# Patient Record
Sex: FEMALE | Race: BLACK OR AFRICAN AMERICAN | NOT HISPANIC OR LATINO | Employment: UNEMPLOYED | URBAN - METROPOLITAN AREA
[De-identification: names, ages, dates, MRNs, and addresses within clinical notes are randomized per-mention and may not be internally consistent; named-entity substitution may affect disease eponyms.]

---

## 2019-02-22 PROBLEM — Z34.90 ENCOUNTER FOR ELECTIVE INDUCTION OF LABOR: Status: ACTIVE | Noted: 2019-02-22

## 2019-03-20 ENCOUNTER — OCCUPATIONAL HEALTH (OUTPATIENT)
Dept: URGENT CARE | Facility: CLINIC | Age: 24
End: 2019-03-20

## 2019-03-20 PROCEDURE — 80305 PR NON-DOT DRUG SCREENS: ICD-10-PCS | Mod: S$GLB,,, | Performed by: EMERGENCY MEDICINE

## 2019-03-20 PROCEDURE — 86580 PR  TB INTRADERMAL TEST: ICD-10-PCS | Mod: S$GLB,,, | Performed by: EMERGENCY MEDICINE

## 2019-03-20 PROCEDURE — 80305 DRUG TEST PRSMV DIR OPT OBS: CPT | Mod: S$GLB,,, | Performed by: EMERGENCY MEDICINE

## 2019-03-20 PROCEDURE — 86580 TB INTRADERMAL TEST: CPT | Mod: S$GLB,,, | Performed by: EMERGENCY MEDICINE

## 2019-05-27 ENCOUNTER — HOSPITAL ENCOUNTER (INPATIENT)
Facility: HOSPITAL | Age: 24
LOS: 2 days | Discharge: HOME | DRG: 881 | End: 2019-05-29
Attending: PSYCHIATRY & NEUROLOGY | Admitting: PSYCHIATRY & NEUROLOGY
Payer: MEDICAID

## 2019-05-27 DIAGNOSIS — F32.A DEPRESSION: ICD-10-CM

## 2019-05-27 LAB
25(OH)D3+25(OH)D2 SERPL-MCNC: 10 NG/ML (ref 30–96)
ALBUMIN SERPL BCP-MCNC: 4.1 G/DL (ref 3.5–5.2)
ALP SERPL-CCNC: 57 U/L (ref 55–135)
ALT SERPL W/O P-5'-P-CCNC: 19 U/L (ref 10–44)
ANION GAP SERPL CALC-SCNC: 11 MMOL/L (ref 8–16)
AST SERPL-CCNC: 19 U/L (ref 10–40)
BASOPHILS # BLD AUTO: 0.05 K/UL (ref 0–0.2)
BASOPHILS NFR BLD: 0.9 % (ref 0–1.9)
BILIRUB SERPL-MCNC: 1.7 MG/DL (ref 0.1–1)
BUN SERPL-MCNC: 21 MG/DL (ref 6–20)
CALCIUM SERPL-MCNC: 10.1 MG/DL (ref 8.7–10.5)
CHLORIDE SERPL-SCNC: 105 MMOL/L (ref 95–110)
CHOLEST SERPL-MCNC: 196 MG/DL (ref 120–199)
CHOLEST/HDLC SERPL: 4.6 {RATIO} (ref 2–5)
CO2 SERPL-SCNC: 21 MMOL/L (ref 23–29)
CREAT SERPL-MCNC: 0.9 MG/DL (ref 0.5–1.4)
DIFFERENTIAL METHOD: ABNORMAL
EOSINOPHIL # BLD AUTO: 0.3 K/UL (ref 0–0.5)
EOSINOPHIL NFR BLD: 4.3 % (ref 0–8)
ERYTHROCYTE [DISTWIDTH] IN BLOOD BY AUTOMATED COUNT: 14.9 % (ref 11.5–14.5)
EST. GFR  (AFRICAN AMERICAN): >60 ML/MIN/1.73 M^2
EST. GFR  (NON AFRICAN AMERICAN): >60 ML/MIN/1.73 M^2
ESTIMATED AVG GLUCOSE: 91 MG/DL (ref 68–131)
FOLATE SERPL-MCNC: 6.6 NG/ML (ref 4–24)
GLUCOSE SERPL-MCNC: 86 MG/DL (ref 70–110)
GLUCOSE SERPL-MCNC: 86 MG/DL (ref 70–110)
HBA1C MFR BLD HPLC: 4.8 % (ref 4–5.6)
HCT VFR BLD AUTO: 39.5 % (ref 37–48.5)
HDLC SERPL-MCNC: 43 MG/DL (ref 40–75)
HDLC SERPL: 21.9 % (ref 20–50)
HGB BLD-MCNC: 12.7 G/DL (ref 12–16)
LDLC SERPL CALC-MCNC: 136 MG/DL (ref 63–159)
LYMPHOCYTES # BLD AUTO: 2.2 K/UL (ref 1–4.8)
LYMPHOCYTES NFR BLD: 37.2 % (ref 18–48)
MCH RBC QN AUTO: 25.7 PG (ref 27–31)
MCHC RBC AUTO-ENTMCNC: 32.2 G/DL (ref 32–36)
MCV RBC AUTO: 80 FL (ref 82–98)
MONOCYTES # BLD AUTO: 0.5 K/UL (ref 0.3–1)
MONOCYTES NFR BLD: 8.8 % (ref 4–15)
NEUTROPHILS # BLD AUTO: 2.9 K/UL (ref 1.8–7.7)
NEUTROPHILS NFR BLD: 48.8 % (ref 38–73)
NONHDLC SERPL-MCNC: 153 MG/DL
PLATELET # BLD AUTO: 296 K/UL (ref 150–350)
PMV BLD AUTO: 11.3 FL (ref 9.2–12.9)
POTASSIUM SERPL-SCNC: 3.9 MMOL/L (ref 3.5–5.1)
PROT SERPL-MCNC: 7.8 G/DL (ref 6–8.4)
RBC # BLD AUTO: 4.94 M/UL (ref 4–5.4)
SODIUM SERPL-SCNC: 137 MMOL/L (ref 136–145)
TRIGL SERPL-MCNC: 85 MG/DL (ref 30–150)
TSH SERPL DL<=0.005 MIU/L-ACNC: 0.79 UIU/ML (ref 0.4–4)
VIT B12 SERPL-MCNC: 704 PG/ML (ref 210–950)
WBC # BLD AUTO: 5.88 K/UL (ref 3.9–12.7)

## 2019-05-27 PROCEDURE — 90833 PR PSYCHOTHERAPY W/PATIENT W/E&M, 30 MIN (ADD ON): ICD-10-PCS | Mod: AF,HB,S$PBB, | Performed by: PSYCHIATRY & NEUROLOGY

## 2019-05-27 PROCEDURE — 11400000 HC PSYCH PRIVATE ROOM

## 2019-05-27 PROCEDURE — 90833 PSYTX W PT W E/M 30 MIN: CPT | Mod: AF,HB,S$PBB, | Performed by: PSYCHIATRY & NEUROLOGY

## 2019-05-27 PROCEDURE — 25000003 PHARM REV CODE 250: Performed by: PSYCHIATRY & NEUROLOGY

## 2019-05-27 PROCEDURE — 82607 VITAMIN B-12: CPT

## 2019-05-27 PROCEDURE — 82746 ASSAY OF FOLIC ACID SERUM: CPT

## 2019-05-27 PROCEDURE — 83540 ASSAY OF IRON: CPT

## 2019-05-27 PROCEDURE — 99222 1ST HOSP IP/OBS MODERATE 55: CPT | Mod: ,,, | Performed by: NURSE PRACTITIONER

## 2019-05-27 PROCEDURE — 80061 LIPID PANEL: CPT

## 2019-05-27 PROCEDURE — 99222 PR INITIAL HOSPITAL CARE,LEVL II: ICD-10-PCS | Mod: ,,, | Performed by: NURSE PRACTITIONER

## 2019-05-27 PROCEDURE — 85025 COMPLETE CBC W/AUTO DIFF WBC: CPT

## 2019-05-27 PROCEDURE — 99223 1ST HOSP IP/OBS HIGH 75: CPT | Mod: AF,HB,S$PBB, | Performed by: PSYCHIATRY & NEUROLOGY

## 2019-05-27 PROCEDURE — 36415 COLL VENOUS BLD VENIPUNCTURE: CPT

## 2019-05-27 PROCEDURE — 83036 HEMOGLOBIN GLYCOSYLATED A1C: CPT

## 2019-05-27 PROCEDURE — 82728 ASSAY OF FERRITIN: CPT

## 2019-05-27 PROCEDURE — 84443 ASSAY THYROID STIM HORMONE: CPT

## 2019-05-27 PROCEDURE — 99223 PR INITIAL HOSPITAL CARE,LEVL III: ICD-10-PCS | Mod: AF,HB,S$PBB, | Performed by: PSYCHIATRY & NEUROLOGY

## 2019-05-27 PROCEDURE — 80053 COMPREHEN METABOLIC PANEL: CPT

## 2019-05-27 PROCEDURE — 82306 VITAMIN D 25 HYDROXY: CPT

## 2019-05-27 RX ORDER — DOCUSATE SODIUM 100 MG/1
100 CAPSULE, LIQUID FILLED ORAL DAILY PRN
Status: DISCONTINUED | OUTPATIENT
Start: 2019-05-27 | End: 2019-05-29 | Stop reason: HOSPADM

## 2019-05-27 RX ORDER — OLANZAPINE 10 MG/1
10 TABLET ORAL EVERY 6 HOURS PRN
Status: DISCONTINUED | OUTPATIENT
Start: 2019-05-27 | End: 2019-05-29 | Stop reason: HOSPADM

## 2019-05-27 RX ORDER — LOPERAMIDE HYDROCHLORIDE 2 MG/1
2 CAPSULE ORAL
Status: DISCONTINUED | OUTPATIENT
Start: 2019-05-27 | End: 2019-05-29 | Stop reason: HOSPADM

## 2019-05-27 RX ORDER — ESCITALOPRAM OXALATE 5 MG/1
5 TABLET ORAL DAILY
Status: DISCONTINUED | OUTPATIENT
Start: 2019-05-27 | End: 2019-05-29 | Stop reason: HOSPADM

## 2019-05-27 RX ORDER — OLANZAPINE 10 MG/2ML
10 INJECTION, POWDER, FOR SOLUTION INTRAMUSCULAR EVERY 6 HOURS PRN
Status: DISCONTINUED | OUTPATIENT
Start: 2019-05-27 | End: 2019-05-29 | Stop reason: HOSPADM

## 2019-05-27 RX ORDER — MAG HYDROX/ALUMINUM HYD/SIMETH 200-200-20
30 SUSPENSION, ORAL (FINAL DOSE FORM) ORAL EVERY 6 HOURS PRN
Status: DISCONTINUED | OUTPATIENT
Start: 2019-05-27 | End: 2019-05-29 | Stop reason: HOSPADM

## 2019-05-27 RX ORDER — HYDROXYZINE PAMOATE 50 MG/1
50 CAPSULE ORAL EVERY 6 HOURS PRN
Status: DISCONTINUED | OUTPATIENT
Start: 2019-05-27 | End: 2019-05-29 | Stop reason: HOSPADM

## 2019-05-27 RX ORDER — ACETAMINOPHEN 325 MG/1
650 TABLET ORAL EVERY 6 HOURS PRN
Status: DISCONTINUED | OUTPATIENT
Start: 2019-05-27 | End: 2019-05-29 | Stop reason: HOSPADM

## 2019-05-27 RX ADMIN — ESCITALOPRAM OXALATE 5 MG: 5 TABLET, FILM COATED ORAL at 02:05

## 2019-05-27 NOTE — H&P
"PSYCHIATRY INPATIENT ADMISSION NOTE - H & P      5/27/2019 10:19 AM   Aida Mathew   1995   [id number]           DATE OF ADMISSION: 5/27/2019  2:37 AM    SITE: [facility]    CURRENT LEGAL STATUS: PEC and/or CEC      HISTORY    CHIEF COMPLAINT   Aida Mathew is a 24 y.o. female with no psychiatric history, currently admitted to the inpatient unit with the following chief complaint: depression, SI/HI, "I texted my mom that I wanted to kill myself, because I was angry"    HPI   (Elements: Location, Quality, Severity, Duration, Timing, Content, Modifying Factors, Associated Signs & Symptoms)    The patient was seen and examined. The chart was reviewed.    The patient presented to the ER on 5/26/19 with complaints of mood disturbance and HI/SI. Per staff notes:  -joet came to ER by police for suicidal and homicidal ideations reported by her mother. patient is 3 months post pardon. patient was upset with mother for taking car away that she stated she wanted to hurt self and baby. Denies any alcohol or drug use. Patient lives with mom and step father. Patient has 2 girls 5 yr old and 3 months ago. She has high school diploma and currently unemployed,. Currently denies any suicidal or homicidal .    The patient was medically cleared and admitted to the U.     The patient reports no past history of psychiatric issues. She reports that she had her second child in February (no h/o post-partum psychiatric issues), and since then she has had significant post-partum depression issues. She reports that she has been crying a lot and feeling more depressed. She also reports that she has had increased anxiety over the last few months.     Psychosocial stressors include unemployment/financial stress, family stressors, relationship stressors and transportation issues.     She is not breastfeeding.    The patient reports, "I had a really bad day yesterday.. I got mad and said things I didn't mean. I feel better " "today."    +Symptoms of Depression: +diminished mood or loss of interest/anhedonia; +irritability, no diminished energy, no change in sleep, no change in appetite, no diminished concentration or cognition or indecisiveness, no PMA/R, denied excessive guilt or hopelessness or worthlessness, denied current suicidal ideations (+SI expressed in the last 24 hours)    Denied changes in Sleep: no trouble with initiation, maintenance, or early morning awakening with inability to return to sleep    Denied current Suicidal/Homicidal ideations: denied active/passive ideations, organized plans, or future intentions; +SI reported in th last 24 hours; pt denied ever stating that she would hurt her child- she claims that her mother said that in order to force her into the hospital    Denied past or current Symptoms of psychosis: no hallucinations, delusions, disorganized thinking, disorganized behavior or abnormal motor behavior, or negative symptoms     Denied past or current Symptoms of eliud or hypomania: no elevated, expansive, or irritable mood with no increased energy or activity; with inflated self-esteem or grandiosity, decreased need for sleep, increased rate of speech, FOI or racing thoughts, distractibility, increased goal directed activity or PMA, or risky/disinhibited behavior    Some Symptoms of DANY: +excessive anxiety/worry/fear, +more days than not, not about numerous issues, +difficult to control, with periodic symptoms of restlessness, fatigue, poor concentration, irritability, muscle tension, and sleep disturbance; +causes functionally impairing distress; adjustment in nature    Denied Symptoms of Panic Disorder: no recurrent panic attacks, without agoraphobia    Denied Symptoms of PTSD: no h/o trauma; no re-experiencing/intrusive symptoms, avoidant behavior, negative alterations in cognition or mood, or hyperarousal symptoms;  without dissociative symptoms     Denied Symptoms of OCD: no obsessions or " compulsions     Denied Symptoms of Eating Disorders: no anorexia, bulimia or binging    Denied Substance Use: no intoxication, withdrawal, tolerance, used in larger amounts or duration than intended, unsuccessful attempts to limit or quit, increased time engaging in or seeking out, cravings or strong desire to use, failure to fulfill obligations, negative consequences in social/interpersonal/occupational,/recreational areas, use in dangerous situations, or medical or psychological consequences       PSYCHOTHERAPY ADD-ON +[id number]   30 (16-37*) minutes    Time: 16 minutes  Participants: Met with patient    Therapeutic Intervention Type: behavior modifying psychotherapy, supportive psychotherapy  Why chosen therapy is appropriate versus another modality: relevant to diagnosis, patient responds to this modality, evidence based practice    Target symptoms: depression, anxiety   Primary focus: depression, psychosocial stressors  Psychotherapeutic techniques: supportive and problem solving techniques; psycho-education;     Outcome monitoring methods: self-report, observation    Patient's response to intervention:  The patient's response to intervention is accepting.    Progress toward goals:  The patient's progress toward goals is fair .            PAST PSYCHIATRIC HISTORY  Previous Psychiatric Hospitalizations: denied   Previous SI/HI: denied  Previous Suicide Attempts: denied   Previous Medication Trials: denied  Psychiatric Care (current & past): denied  History of Psychotherapy: denied  History of Violence: denied      SUBSTANCE ABUSE HISTORY   Tobacco: denied  Alcohol: denied  Illicit Substances: denied  Misuse of Prescription Medications: denied  Detoxes: denied  Rehabs: denied  12 Step Meetings: denied  Periods of Sobriety: denied  Withdrawal: denied        PAST MEDICAL & SURGICAL HISTORY   History reviewed. No pertinent past medical history.  History reviewed. No pertinent surgical history.      CURRENT MEDICATION  REGIMEN   Home Meds:   Prior to Admission medications    Medication Sig Start Date End Date Taking? Authorizing Provider   ferrous sulfate 325 (65 FE) MG EC tablet Take 325 mg by mouth 3 (three) times daily with meals.    Historical Provider, MD   ibuprofen (ADVIL,MOTRIN) 600 MG tablet Take 1 tablet (600 mg total) by mouth every 6 (six) hours. 2/24/19   Braeden Wayne MD   oxyCODONE-acetaminophen (PERCOCET) 5-325 mg per tablet Take 1 tablet by mouth every 4 (four) hours as needed. 2/24/19   Braeden Wayne MD   prenatal 25/iron fum/folic/dha (PRENATAL-1 ORAL) Take 1 tablet by mouth once daily.    Historical Provider, MD         OTC Meds: none    Scheduled Meds:    PRN Meds: hydrOXYzine pamoate, OLANZapine **AND** OLANZapine   Psychotherapeutics (From admission, onward)    Start     Stop Route Frequency Ordered    05/27/19 0244  OLANZapine tablet 10 mg  (Olanzapine)      -- Oral Every 6 hours PRN 05/27/19 0244    05/27/19 0244  OLANZapine injection 10 mg  (Olanzapine)      -- IM Every 6 hours PRN 05/27/19 0244            ALLERGIES   Review of patient's allergies indicates:   Allergen Reactions    Amoxil [amoxicillin]     Penicillins          NEUROLOGIC HISTORY  Seizures: denied   Head trauma: denied       FAMILY PSYCHIATRIC HISTORY   Family History   Problem Relation Age of Onset    Diabetes Mother     Diabetes Maternal Grandmother               SOCIAL HISTORY  Developmental/Childhood: met milestones  History of Physical/Sexual Abuse: denied  Education: graduated HS   Employment: unemployed  Financial: strained   Relationship Status/Sexual Orientation: never ; currently single   Children: 2   Housing Status: lives with mother, step-father, and the patient's 2 children    Shinto: Latter day   History: denied   Recreational Activities: time with kids  Access to Gun: denied       LEGAL HISTORY   Past Charges/Incarcerations:denied   Pending Charges: denied      ROS  General ROS:  "negative  Ophthalmic ROS: negative  ENT ROS: negative  Allergy and Immunology ROS: negative  Hematological and Lymphatic ROS: negative  Endocrine ROS: negative  Respiratory ROS: no cough, shortness of breath, or wheezing  Cardiovascular ROS: no chest pain or dyspnea on exertion  Gastrointestinal ROS: no abdominal pain, change in bowel habits, or black or bloody stools  Genito-Urinary ROS: no dysuria, trouble voiding, or hematuria  Musculoskeletal ROS: negative  Neurological ROS: no TIA or stroke symptoms  Dermatological ROS: negative      EXAMINATION      PHYSICAL EXAM  Reviewed note/exam by ER physician in paper chart; FM consult pending    VITALS   Vitals:    05/27/19 0735   BP: 117/60   Pulse: 62   Resp: 18   Temp: 97.9 °F (36.6 °C)      Body mass index is 19.68 kg/m².      PAIN  0/10  Subjective report of pain matches objective signs and symptoms: Yes      LABORATORY DATA   Recent Results (from the past 72 hour(s))   Lipid panel    Collection Time: 05/27/19  6:19 AM   Result Value Ref Range    Cholesterol 196 120 - 199 mg/dL    Triglycerides 85 30 - 150 mg/dL    HDL 43 40 - 75 mg/dL    LDL Cholesterol 136.0 63.0 - 159.0 mg/dL    Hdl/Cholesterol Ratio 21.9 20.0 - 50.0 %    Total Cholesterol/HDL Ratio 4.6 2.0 - 5.0    Non-HDL Cholesterol 153 mg/dL      No results found for: PHENYTOIN, PHENOBARB, VALPROATE, CBMZ        CONSTITUTIONAL  General Appearance: AAF, in casual attire, normal weight; NAD    MUSCULOSKELETAL  Muscle Strength and Tone:  normal  Abnormal Involuntary Movements:  none  Gait and Station:  normal; non-ataxic    PSYCHIATRIC   Level of Consciousness: awake, alert  Orientation: p/p/t/s  Grooming:  adequate to circumstances  Psychomotor Behavior: no PMA/R  Speech: nl r/t/v/s  Language:  English fluent  Mood: "ok"  Affect: improving range, anxious, sad  Thought Process:  linear and organized  Associations:  intact; no MARY ELLEN  Thought Content:  denied AVH/delusions; denied HI/SI  Memory:  intact to recent " and remote events  Attention:  intact to conversation; not distractible   Fund of Knowledge:  age and education appropriate  Estimate if Intelligence:  average based on work/education history, vocabulary and mental status exam  Insight:  good- seeks help, understands/accepts illness  Judgment:   good- no bx issues, compliant and cooperative        PSYCHOSOCIAL      PSYCHOSOCIAL STRESSORS   family, financial and occupational    FUNCTIONING RELATIONSHIPS   good support system      STRENGTHS AND LIABILITIES   Strength: Patient accepts guidance/feedback, Strength: Patient is expressive/articulate., Liability: Patient is unstable., Liability: Patient lacks coping skills.      Is the patient aware of the biomedical complications associated with substance abuse and mental illness? yes    Does the patient have an Advance Directive for Mental Health treatment? no  (If yes, inform patient to bring copy.)        ASSESSMENT     IMPRESSION   Unspecified Depressive Disorder  Unspecified Anxiety Disorder    Psychosocial stressors    H/o Iron efficiency anemia     Post partum      MEDICAL DECISION MAKING        PROBLEM LIST AND MANAGEMENT PLANS; PRESCRIPTION DRUG MANAGEMENT  Compliance: yes  Side Effects: no  Regimen Adjustments:   Depression: pt counseled; symptoms appear to be in part post partum depression and in part adjustment related depression; start trial of Lexapro 5 mg po q day    Anxiety: pt counseled; symptoms appear to be in part post partum anxiety and in part adjustment related anxiety; Lexapro as above    Psychosocial stressors: pt counseled; SW consulted to assist with resources; refer to [employer] for help with employment; will verify with family the patient';s account and contact CPS if indicated    H/o Iron efficiency anemia: check CBC, ferritin and iron panel     Post partum: pt counseled; no current gynecologic complaints; f/u with ObGYN as an outpatient as scheduled        DIAGNOSTIC TESTING  Labs reviewed  with patient; follow up pending labs; Check CBC, CMP, Ferritin, Iron panel, B12, folate, Vitamin D, and TSH    Disposition:  -SW to assist with aftercare planning and collateral  -Once stable discharge home with outpatient follow up care and/or rehab  -Continue inpatient treatment under a PEC and/or CEC for danger to self,  danger to others, and grave disability as evident by recently expressed SI with depression and reports of HI. ELOS is 3-5 days pending collateral.       Deuce Cuellar MD  Psychiatry

## 2019-05-27 NOTE — NURSING
Received report from Deion CASTELLANOS [facility]. Deion reports eulalia came to ER by police for suicidal and homicidal ideations reported by her mother. patient is 3 months post pardon. patient was upset with mother for taking car away that she stated she wanted to hurt self and baby. Denies any alcohol or drug use. Patient lives with mom and step father. Patient has 2 girls 5 yr old and 3 months ago. She has high school diploma and currently unemployed,. Currently denies any suicidal or homicidal . patient arrived at 0237am accompanied by security. No contraband found.  ideations. Educated patient ton depression. patient calm and cooperative.Oriented patient to unit. Will continue to monitor patient.

## 2019-05-27 NOTE — PROGRESS NOTES
05/27/19 1400   [facility] Group Therapy   Group Name Leisure Skills Training   Specific Interventions Cognitive Stimulation Training   Participation Level Active;Appropriate   Participation Quality Cooperative   Insight/Motivation Good   Affect/Mood Display Appropriate   Cognition Alert   Psychomotor WNL   Patient participates easily in activity, becomes involved, shows friendly competition, desires to win.

## 2019-05-27 NOTE — CONSULTS
[facility] Hospital Medicine  Consult Note    Patient Name: Aida Mathew  MRN: [id number]  Admission Date: 5/27/2019  Hospital Length of Stay: 0 days  Attending Physician: Clifton Aguilar MD   Primary Care Provider: Lidia Navarro MD           Patient information was obtained from patient and ER records.     Inpatient consult to [facility] for History and Physical  Consult performed by: Deisi Pena NP  Consult ordered by: Deuce Cuellar MD        Subjective:     Principal Problem: Postpartum depression    Chief Complaint: No chief complaint on file.       HPI: 23 yo female patient brought in to ER for depression. She was admitted to [facility] and medicine consulted for H/P. VSS afebrile. Labs reviewed    History reviewed. No pertinent past medical history.    History reviewed. No pertinent surgical history.    Review of patient's allergies indicates:   Allergen Reactions    Amoxil [amoxicillin]     Penicillins        No current facility-administered medications on file prior to encounter.      Current Outpatient Medications on File Prior to Encounter   Medication Sig    [DISCONTINUED] ferrous sulfate 325 (65 FE) MG EC tablet Take 325 mg by mouth 3 (three) times daily with meals.    [DISCONTINUED] ibuprofen (ADVIL,MOTRIN) 600 MG tablet Take 1 tablet (600 mg total) by mouth every 6 (six) hours.    [DISCONTINUED] oxyCODONE-acetaminophen (PERCOCET) 5-325 mg per tablet Take 1 tablet by mouth every 4 (four) hours as needed.    [DISCONTINUED] prenatal 25/iron fum/folic/dha (PRENATAL-1 ORAL) Take 1 tablet by mouth once daily.     Family History     Problem Relation (Age of Onset)    Diabetes Mother, Maternal Grandmother        Tobacco Use    Smoking status: Never Smoker   Substance and Sexual Activity    Alcohol use: Not on file    Drug use: Not on file    Sexual activity: Not on file     Review of Systems   Constitutional: Negative for chills, fatigue, fever and unexpected  weight change.   HENT: Negative for congestion, ear pain, sore throat and trouble swallowing.    Eyes: Negative for pain and visual disturbance.   Respiratory: Negative for cough, chest tightness and shortness of breath.    Cardiovascular: Negative for chest pain, palpitations and leg swelling.   Gastrointestinal: Negative for abdominal distention, abdominal pain, constipation, diarrhea and vomiting.   Genitourinary: Negative for difficulty urinating, dysuria, flank pain, frequency and hematuria.   Musculoskeletal: Negative for back pain, gait problem, joint swelling, neck pain and neck stiffness.   Skin: Negative for rash and wound.   Neurological: Negative for dizziness, seizures, speech difficulty, light-headedness and headaches.   Psychiatric/Behavioral: Negative for self-injury and suicidal ideas. The patient is not nervous/anxious.      Objective:     Vital Signs (Most Recent):  Temp: 97.9 °F (36.6 °C) (05/27/19 0735)  Pulse: 62 (05/27/19 0735)  Resp: 18 (05/27/19 0735)  BP: 117/60 (05/27/19 0735) Vital Signs (24h Range):  Temp:  [97.9 °F (36.6 °C)] 97.9 °F (36.6 °C)  Pulse:  [62-72] 62  Resp:  [18] 18  BP: (117-138)/(60-81) 117/60     Weight: 52 kg (114 lb 10.2 oz)  Body mass index is 19.68 kg/m².    Physical Exam   Constitutional: She is oriented to person, place, and time. She appears well-developed and well-nourished.   HENT:   Head: Normocephalic and atraumatic.   Neck: No thyromegaly present.   Cardiovascular: Normal rate, regular rhythm, normal heart sounds and intact distal pulses.   No murmur heard.  Pulmonary/Chest: Effort normal and breath sounds normal. No respiratory distress. She has no wheezes. She has no rales.   Abdominal: Soft. Bowel sounds are normal. She exhibits no distension and no mass. There is no tenderness.   Musculoskeletal: Normal range of motion. She exhibits no edema.   Lymphadenopathy:     She has no cervical adenopathy.   Neurological: She is alert and oriented to person, place,  and time. Abnormal muscle tone:   CBC wbc 9.8, h/h 2.     Neuro: Cranial nerves:  CN II Visual fields full to confrontation.   CN III, IV, VI Pupils are equal, round, and reactive to light.  CN III: no palsy  Nystagmus: none   Diplopia: none  Ophthalmoparesis: none  CN V Facial sensation intact.   CN VII Facial expression full, symmetric.   CN VIII normal.   CN IX normal.   CN X normal.   CN XI normal.   CN XII normal.         Skin: Skin is warm and dry. No erythema.   Nursing note and vitals reviewed.      Significant Labs:      wbc 9.8, H/H 13/41, plt 310  Na 136, K 3.9, BUN/creat 21/0.69, glucose 100  LFT WNL  U/a negative  TSH 0.59  acetaminophen <10  Salicylate <4.0  UDS negative  HCG negative    Assessment/Plan:     * Postpartum depression  Further orders per psych         VTE Risk Mitigation (From admission, onward)    None              Thank you for your consult. I will sign off. Please contact us if you have any additional questions.    Deisi Pena NP  Department of Hospital Medicine   [facility]

## 2019-05-27 NOTE — PLAN OF CARE
Problem: Adult Behavioral Health Plan of Care  Goal: Plan of Care Review  Outcome: Ongoing (interventions implemented as appropriate)  Plan of care reviewed with patient, patient agrees with plan of care. Denies suicidal ideations and homicidal ideations. Does not interact with staff and peers, stays in bed between care. Compliant with medication. Accepts meals and snacks. Pleasant, calm and cooperative. Gait steady, no falls. Clear pathways and clutter-free environment maintained. Promoted an individualized safety plan, effective coping skills, and motivators to improve mood and resolve depression. Will continue to monitor for safety.

## 2019-05-27 NOTE — PROGRESS NOTES
"   05/27/19 5476   Assessment   Patient's Identification of the Problem Patient presents with tearful, cooperative affect and "depressed, down" mood. Patient states her admit is due to "I text my mom that I wanted to kill myself." Patient reports she was mad at her mother for taking her car away. Patient reports she recently broke up with her boyfriend, the father of her baby about one month ago. Patient reports she was angry with her boyfriend because he would not babysit their baby. Patient denies alcohol or drug use. Patient is single, 2 daughters ages 3 months and 5 years old, high school graduate, currently unemployed(last job was Jan., worked at a [employer]). Patient verbalized her main goal is to deal with anger better.   Leisure Interest Watching TV;Shopping;Listen to Music;House Chores;Movies;Enjoy Family/Friends;Sports;Mormon  (like being on social media)   Leisure Barriers Lack of Finances   Treatment Focus To Improve Mood;To Improve Leisure Awareness/Lifestyle/Interest;To Promote Successful and Safe Self Expression;To Improve Coping Skills;Increase Problems Solving and Decision Making Skills     Treatment Recommendation: To address problem(s) #  1:1 Intervention (as needed)    Cognitive Stimulation Skilled Activity  Creative Expression Skilled Activity  Mild Exercises Skilled Activity  Stress Management Skilled Activity  Coping Skilled Activity  Leisure Education and Awareness Skilled Activity    Treatment Goal(s):  Long Term Goals Refer To Master Treatment Plan    Short Term Treatment Goal(s)  Patient Will:  Exhibit Improvement in Mood  Demonstrate Constructive Expression of Feelings and Behavior  Identify at Least 2 Coping Skills or Leisure Skills to Reduce Depression and Hopelessness Upon Request from Therapist    Discharge Recommendations:  Encourage Patient to Actively Utilize Available Community Resources to Increase Leisure Involvement to Decrease Signs and Symptoms of Illness  Encourage Patient " to Utilize Coping Skills on a Regular Basis to Reduce the Risk of Decompensating and Re-Hospitalizations  Follow Up with After Care Appointments  Continue with Current Leisure Activities

## 2019-05-27 NOTE — NURSING
Pt is sleeping at this time and has slept 2 hours intermittently.  NAD.  Resp even & unlabored.  Pathways clear and bed in low position.  Q 15 minute safety checks ongoing.  All precautions maintained

## 2019-05-27 NOTE — HPI
25 yo female patient brought in to ER for depression. She was admitted to [facility] and medicine consulted for H/P. VSS afebrile. Labs reviewed

## 2019-05-27 NOTE — SUBJECTIVE & OBJECTIVE
History reviewed. No pertinent past medical history.    History reviewed. No pertinent surgical history.    Review of patient's allergies indicates:   Allergen Reactions    Amoxil [amoxicillin]     Penicillins        No current facility-administered medications on file prior to encounter.      Current Outpatient Medications on File Prior to Encounter   Medication Sig    [DISCONTINUED] ferrous sulfate 325 (65 FE) MG EC tablet Take 325 mg by mouth 3 (three) times daily with meals.    [DISCONTINUED] ibuprofen (ADVIL,MOTRIN) 600 MG tablet Take 1 tablet (600 mg total) by mouth every 6 (six) hours.    [DISCONTINUED] oxyCODONE-acetaminophen (PERCOCET) 5-325 mg per tablet Take 1 tablet by mouth every 4 (four) hours as needed.    [DISCONTINUED] prenatal 25/iron fum/folic/dha (PRENATAL-1 ORAL) Take 1 tablet by mouth once daily.     Family History     Problem Relation (Age of Onset)    Diabetes Mother, Maternal Grandmother        Tobacco Use    Smoking status: Never Smoker   Substance and Sexual Activity    Alcohol use: Not on file    Drug use: Not on file    Sexual activity: Not on file     Review of Systems   Constitutional: Negative for chills, fatigue, fever and unexpected weight change.   HENT: Negative for congestion, ear pain, sore throat and trouble swallowing.    Eyes: Negative for pain and visual disturbance.   Respiratory: Negative for cough, chest tightness and shortness of breath.    Cardiovascular: Negative for chest pain, palpitations and leg swelling.   Gastrointestinal: Negative for abdominal distention, abdominal pain, constipation, diarrhea and vomiting.   Genitourinary: Negative for difficulty urinating, dysuria, flank pain, frequency and hematuria.   Musculoskeletal: Negative for back pain, gait problem, joint swelling, neck pain and neck stiffness.   Skin: Negative for rash and wound.   Neurological: Negative for dizziness, seizures, speech difficulty, light-headedness and headaches.    Psychiatric/Behavioral: Negative for self-injury and suicidal ideas. The patient is not nervous/anxious.      Objective:     Vital Signs (Most Recent):  Temp: 97.9 °F (36.6 °C) (05/27/19 0735)  Pulse: 62 (05/27/19 0735)  Resp: 18 (05/27/19 0735)  BP: 117/60 (05/27/19 0735) Vital Signs (24h Range):  Temp:  [97.9 °F (36.6 °C)] 97.9 °F (36.6 °C)  Pulse:  [62-72] 62  Resp:  [18] 18  BP: (117-138)/(60-81) 117/60     Weight: 52 kg (114 lb 10.2 oz)  Body mass index is 19.68 kg/m².    Physical Exam   Constitutional: She is oriented to person, place, and time. She appears well-developed and well-nourished.   HENT:   Head: Normocephalic and atraumatic.   Neck: No thyromegaly present.   Cardiovascular: Normal rate, regular rhythm, normal heart sounds and intact distal pulses.   No murmur heard.  Pulmonary/Chest: Effort normal and breath sounds normal. No respiratory distress. She has no wheezes. She has no rales.   Abdominal: Soft. Bowel sounds are normal. She exhibits no distension and no mass. There is no tenderness.   Musculoskeletal: Normal range of motion. She exhibits no edema.   Lymphadenopathy:     She has no cervical adenopathy.   Neurological: She is alert and oriented to person, place, and time. Abnormal muscle tone:   CBC wbc 9.8, h/h 2.     Neuro: Cranial nerves:  CN II Visual fields full to confrontation.   CN III, IV, VI Pupils are equal, round, and reactive to light.  CN III: no palsy  Nystagmus: none   Diplopia: none  Ophthalmoparesis: none  CN V Facial sensation intact.   CN VII Facial expression full, symmetric.   CN VIII normal.   CN IX normal.   CN X normal.   CN XI normal.   CN XII normal.         Skin: Skin is warm and dry. No erythema.   Nursing note and vitals reviewed.      Significant Labs:      wbc 9.8, H/H 13/41, plt 310  Na 136, K 3.9, BUN/creat 21/0.69, glucose 100  LFT WNL  U/a negative  TSH 0.59  acetaminophen <10  Salicylate <4.0  UDS negative  HCG negative

## 2019-05-27 NOTE — PLAN OF CARE
Problem: Adult Behavioral Health Plan of Care  Goal: Optimized Coping Skills in Response to Life Stressors    Intervention: Promote Effective Coping Strategies  Group Note      Behavior    Patient attended group psychotherapy. Patient exhibited very severe depressive mood with appropriate affect.       Intervention     CBT-based group psychotherapy today consisted of a review of internal coping strategies.       Response    Patient stated that she was hoping to continue have insights about how to take more control over her own life. She liked the topic today. She stated that she agrees that it is goo to have a positive attitude.       Plan    To continue to encourage the patient to attend group psychotherapy with focused attention on continued work in the area of internal coping strategies.

## 2019-05-28 LAB
FERRITIN SERPL-MCNC: 35 NG/ML (ref 20–300)
IRON SERPL-MCNC: 141 UG/DL (ref 30–160)
SATURATED IRON: 31 % (ref 20–50)
TOTAL IRON BINDING CAPACITY: 462 UG/DL (ref 250–450)
TRANSFERRIN SERPL-MCNC: 312 MG/DL (ref 200–375)

## 2019-05-28 PROCEDURE — 99233 SBSQ HOSP IP/OBS HIGH 50: CPT | Mod: AF,HB,S$PBB, | Performed by: PSYCHIATRY & NEUROLOGY

## 2019-05-28 PROCEDURE — 25000003 PHARM REV CODE 250: Performed by: PSYCHIATRY & NEUROLOGY

## 2019-05-28 PROCEDURE — 90833 PR PSYCHOTHERAPY W/PATIENT W/E&M, 30 MIN (ADD ON): ICD-10-PCS | Mod: AF,HB,S$PBB, | Performed by: PSYCHIATRY & NEUROLOGY

## 2019-05-28 PROCEDURE — 11400000 HC PSYCH PRIVATE ROOM

## 2019-05-28 PROCEDURE — 90833 PSYTX W PT W E/M 30 MIN: CPT | Mod: AF,HB,S$PBB, | Performed by: PSYCHIATRY & NEUROLOGY

## 2019-05-28 PROCEDURE — 99233 PR SUBSEQUENT HOSPITAL CARE,LEVL III: ICD-10-PCS | Mod: AF,HB,S$PBB, | Performed by: PSYCHIATRY & NEUROLOGY

## 2019-05-28 RX ORDER — FERROUS SULFATE 325(65) MG
325 TABLET ORAL DAILY
Status: DISCONTINUED | OUTPATIENT
Start: 2019-05-28 | End: 2019-05-29 | Stop reason: HOSPADM

## 2019-05-28 RX ORDER — ASPIRIN 325 MG
50000 TABLET, DELAYED RELEASE (ENTERIC COATED) ORAL
Status: DISCONTINUED | OUTPATIENT
Start: 2019-05-28 | End: 2019-05-29 | Stop reason: HOSPADM

## 2019-05-28 RX ADMIN — HYDROXYZINE PAMOATE 50 MG: 50 CAPSULE ORAL at 12:05

## 2019-05-28 RX ADMIN — ESCITALOPRAM OXALATE 5 MG: 5 TABLET, FILM COATED ORAL at 08:05

## 2019-05-28 RX ADMIN — OFLOXACIN 50000 UNITS: 300 TABLET, COATED ORAL at 08:05

## 2019-05-28 RX ADMIN — FERROUS SULFATE TAB 325 MG (65 MG ELEMENTAL FE) 325 MG: 325 (65 FE) TAB at 08:05

## 2019-05-28 NOTE — PLAN OF CARE
Problem: Adult Behavioral Health Plan of Care  Goal: Plan of Care Review  Outcome: Ongoing (interventions implemented as appropriate)  Pt.  Slept 6.5  Hours  so far this shift. Pt received Vistaril 50 mg po at 0009 with good results.  q 15 min safety checks done this shift. Safety and comfort maintained. Cont. Plan.

## 2019-05-28 NOTE — PLAN OF CARE
Problem: Adult Behavioral Health Plan of Care  Goal: Plan of Care Review  Plan of care reviewed with patient, patient agrees with plan of care. Denies suicidal ideations and homicidal ideations. Out of room more today. Interacts minimally with staff and peers, sits in dayroom and watches television apart from other peers. Compliant with medication. Voices no needs or wants at this time.  Accepts meals and snacks. Pleasant, calm and cooperative. Gait steady, no falls. Clear pathways and clutter-free environment maintained. Promoted an individualized safety plan, effective coping skills, and motivators to improve mood and resolve depression. Will continue to monitor for safety.

## 2019-05-28 NOTE — PROGRESS NOTES
"PSYCHIATRY DAILY INPATIENT PROGRESS NOTE  SUBSEQUENT HOSPITAL VISIT    ENCOUNTER DATE: 5/28/2019  SITE: [facility]    DATE OF ADMISSION: 5/27/2019  2:37 AM  LENGTH OF STAY: 1 days      HISTORY    CHIEF COMPLAINT   Aida Mathew is a 24 y.o. female, seen during daily mckeon rounds on the inpatient unit.  Aida Mathew presents with the chief complaint of  depression, SI/HI, "I texted my mom that I wanted to kill myself, because I was angry."    HPI   (Elements: Location, Quality, Severity, Duration, Timing, Content, Modifying Factors, Associated Signs & Symptoms)    The patient was seen and examined. The chart was reviewed.     Reviewed notes by LPC, RN, CTRS, and NP from the last 24 hours.    The patient's case was discussed with the treatment team and care providers today, including RN, CTRS, and LPC.    Staff reports no behavioral or management issues.     The patient has been compliant with treatment. The patient denied any side effects.    Improving Symptoms of Depression: less diminished mood or loss of interest/anhedonia; less irritability, no diminished energy, no change in sleep, no change in appetite, no diminished concentration or cognition or indecisiveness, no PMA/R, denied excessive guilt or hopelessness or worthlessness, denied current suicidal ideations (denied SI since admission, no SI reports in over 24 hours)     Denied changes in Sleep: no trouble with initiation, maintenance, or early morning awakening with inability to return to sleep     Denied current Suicidal/Homicidal ideations: denied active/passive ideations, organized plans, or future intentions; no SI reported in th last 24 hours; pt denied ever stating that she would hurt her child- she claims that her mother said that in order to force her into the hospital; she reports that she would never hurt herself or anyone else (especillay not her child; "I would never do that I love my family and myself."     Denied past or current Symptoms of " psychosis: no hallucinations, delusions, disorganized thinking, disorganized behavior or abnormal motor behavior, or negative symptoms      Denied past or current Symptoms of eliud or hypomania: no elevated, expansive, or irritable mood with no increased energy or activity; with inflated self-esteem or grandiosity, decreased need for sleep, increased rate of speech, FOI or racing thoughts, distractibility, increased goal directed activity or PMA, or risky/disinhibited behavior     Improving Symptoms of Anxiety: less excessive anxiety/worry/fear,  with less symptoms of restlessness, fatigue, poor concentration, irritability, muscle tension, and sleep disturbance       PSYCHOTHERAPY ADD-ON +05147   30 (16-37*) minutes    Time: 16 minutes  Participants: Met with patient    Therapeutic Intervention Type: behavior modifying psychotherapy, supportive psychotherapy  Why chosen therapy is appropriate versus another modality: relevant to diagnosis, patient responds to this modality, evidence based practice    Target symptoms: depression, anxiety   Primary focus: psychosocial stressors  Psychotherapeutic techniques: supportive and problem solving techniques; psycho-education    Outcome monitoring methods: self-report, observation    Patient's response to intervention:  The patient's response to intervention is accepting.    Progress toward goals:  The patient's progress toward goals is fair .          ROS  General ROS: negative  Ophthalmic ROS: negative  ENT ROS: negative  Allergy and Immunology ROS: negative  Hematological and Lymphatic ROS: negative  Endocrine ROS: negative  Respiratory ROS: no cough, shortness of breath, or wheezing  Cardiovascular ROS: no chest pain or dyspnea on exertion  Gastrointestinal ROS: no abdominal pain, change in bowel habits, or black or bloody stools  Genito-Urinary ROS: no dysuria, trouble voiding, or hematuria  Musculoskeletal ROS: negative  Neurological ROS: no TIA or stroke  "symptoms  Dermatological ROS: negative    PAST MEDICAL HISTORY   History reviewed. No pertinent past medical history.        PSYCHOTROPIC MEDICATIONS   Scheduled Meds:   escitalopram oxalate  5 mg Oral Daily     Continuous Infusions:  PRN Meds:.acetaminophen, aluminum-magnesium hydroxide-simethicone, docusate sodium, hydrOXYzine pamoate, loperamide, OLANZapine **AND** OLANZapine        EXAMINATION    VITALS   Vitals:    05/27/19 2059   BP: 131/88   Pulse: 61   Resp: 20   Temp: 98.6 °F (37 °C)       CONSTITUTIONAL  General Appearance: AAF, in casual attire, normal weight; NAD     MUSCULOSKELETAL  Muscle Strength and Tone:  normal  Abnormal Involuntary Movements:  none  Gait and Station:  normal; non-ataxic     PSYCHIATRIC   Level of Consciousness: awake, alert  Orientation: p/p/t/s  Grooming:  adequate to circumstances  Psychomotor Behavior: no PMA/R  Speech: nl r/t/v/s  Language:  English fluent  Mood: "ok.. fine"  Affect: improving range, less anxious, less sad; improving  Thought Process:  linear and organized  Associations:  intact; no MARY ELLEN  Thought Content:  denied AVH/delusions; denied HI/SI  Memory:  intact to recent and remote events  Attention:  intact to conversation; not distractible   Fund of Knowledge:  age and education appropriate  Estimate if Intelligence:  average based on work/education history, vocabulary and mental status exam  Insight:  good- seeks help, understands/accepts illness  Judgment:   good- no bx issues, compliant and cooperative        DIAGNOSTIC TESTING   Laboratory Results  No results found for this or any previous visit (from the past 24 hour(s)).      ASSESSMENT      IMPRESSION   Unspecified Depressive Disorder  Unspecified Anxiety Disorder     Psychosocial stressors     H/o Iron efficiency anemia   Microcytosis   Vitamin D insufficiency      Post partum        MEDICAL DECISION MAKING          PROBLEM LIST AND MANAGEMENT PLANS; PRESCRIPTION DRUG MANAGEMENT  Compliance: yes  Side " Effects: no  Regimen Adjustments:     Depression: pt counseled; symptoms appear to be in part post partum depression and in part adjustment related depression; Continue trial of Lexapro 5 mg po q day     Anxiety: pt counseled; symptoms appear to be in part post partum anxiety and in part adjustment related anxiety; Continue Lexapro as above     Psychosocial stressors: pt counseled; SW consulted and assisting with resources; refer to [employer] for help with employment; will verify with family the patient's account and contact CPS if indicated; seeking family therapy and family/parenting resources     H/o Iron efficiency anemia/Micorcytosis:  Start Iron sulfate 32 mg po q day; f/u with PCP or ObGyn as an outpatient     Post partum: pt counseled; no current gynecologic complaints; f/u with ObGYN as an outpatient as scheduled     Vitamin D insufficiency: Start Vitamin D3 50,000 units po q week x 8 weeks (1/8 completed); f/u with PCP as an outpatient        DIAGNOSTIC TESTING  Labs reviewed with patient; follow up pending labs; Reviewed CBC, CMP, Ferritin, Iron panel, B12, folate, Vitamin D, and TSH with the patient     Disposition:  -SW to assist with aftercare planning and collateral  -Once stable discharge home with outpatient follow up care and/or rehab  -Continue inpatient treatment under a PEC and/or CEC for danger to self,  danger to others, and grave disability as evident by recently expressed SI with depression and reports of HI. ELOS is 3-5 days pending collateral.        NEED FOR CONTINUED HOSPITALIZATION  Psychiatric illness continues to pose a potential threat to life or bodily function, of self or others, thereby requiring the need for continued inpatient psychiatric hospitalization: Yes    Protective inpatient pyschiatric hospitalization required while a safe disposition plan is enacted: Yes    Patient stabilized and ready for discharge from inpatient psychiatric unit: No      STAFF:   Deuce HERRERA  MD [facility]  Psychiatry

## 2019-05-28 NOTE — PROGRESS NOTES
"   05/28/19 1030   [facility] Group Therapy   Group Name Therapeutic Recreation   Specific Interventions Cognitive Stimulation Training   Participation Level Active;Sharing   Participation Quality Cooperative;Social   Insight/Motivation Applies New Skills;Good   Affect/Mood Display Appropriate   Cognition Alert   Psychomotor WNL   Patient learned new skilled leisure activity, showed friendly competition, brainstorm with team members to answer questions and states she had to use "thinking and concentration", skills for this activity.  "

## 2019-05-28 NOTE — NURSING
Received pt. Sitting in dayroom at start of the shift. Mood and affect quiet,restricted. During one to one pt. Denies self harm thoughts and stated she would not hurt herself due to her having a child. Minimized conflict with mother. Cont. Plan.

## 2019-05-28 NOTE — PLAN OF CARE
Problem: Adult Behavioral Health Plan of Care  Goal: Plan of Care Review  Plan of care reviewed with patient, patient agrees with plan of care. Denies suicidal ideations and homicidal ideations. Interacts with staff and peers, sits in dayroom and watches television. Compliant with medication. Accepts meals and snacks. Pleasant, calm and cooperative. Gait steady, no falls. Clear pathways and clutter-free environment maintained. Promoted an individualized safety plan, effective coping skills, and motivators to improve mood and resolve depression. Will continue to monitor for safety.

## 2019-05-28 NOTE — PSYCH
Patient's mother was reached and her name is Bonita Barksdale. She stated that the  and others misunderstood completed what the patient had said and what the mother had said; patient stated that she told the  that the patient threatened herself and not her child. Patient's mother stated that she never heard the patient threaten her child. She stated that she has been talking with the patient daily on the telephone and that the patient is much better at this time.     Patient's mother stated that the patient apologized several times to her; she has accepted the apology. Patient has never had any incidents like this in the past she stated. Patient's mother stated that the patient is a very intelligent and caring mother.     Patient's mother stated that the patient only may have briefly lost her temper but is remorseful for what she said; she would never harm herself nor anyone else she stated.     Patient has no history of violence, no history of substance abuse and no history of any interaction with the legal system.     She stated if the patient is discharged tomorrow she will come to pick her by 2:00 pm. Patient's mother stated that the patient does best when she is employed and is hoping to find a job soon.

## 2019-05-28 NOTE — PLAN OF CARE
Problem: Adult Behavioral Health Plan of Care  Goal: Optimized Coping Skills in Response to Life Stressors    Intervention: Promote Effective Coping Strategies  Group Note        Behavior    Patient attended group psychotherapy today. Patient's depressive symptoms have seems to have lessened since yesterday.         Intervention     CBT-based group psychotherapy with focused attention on discharge planning.       Response    Patient stated that she feels she can apply some life principles to what she learned today that may be of benefit to her tomorrow when she is tentatively scheduled for discharge. Patient stated that she plans to utilize waiting patiently.       Plan    To continue to encourage the patient to attend group psychotherapy with focused attention on discharge planning.

## 2019-05-29 VITALS
WEIGHT: 116.19 LBS | TEMPERATURE: 99 F | HEART RATE: 56 BPM | RESPIRATION RATE: 18 BRPM | SYSTOLIC BLOOD PRESSURE: 116 MMHG | DIASTOLIC BLOOD PRESSURE: 64 MMHG | BODY MASS INDEX: 19.84 KG/M2 | HEIGHT: 64 IN

## 2019-05-29 PROCEDURE — 25000003 PHARM REV CODE 250: Performed by: PSYCHIATRY & NEUROLOGY

## 2019-05-29 PROCEDURE — 99239 PR HOSPITAL DISCHARGE DAY,>30 MIN: ICD-10-PCS | Mod: AF,HB,S$PBB, | Performed by: PSYCHIATRY & NEUROLOGY

## 2019-05-29 PROCEDURE — 99239 HOSP IP/OBS DSCHRG MGMT >30: CPT | Mod: AF,HB,S$PBB, | Performed by: PSYCHIATRY & NEUROLOGY

## 2019-05-29 RX ORDER — FERROUS SULFATE 325(65) MG
325 TABLET ORAL DAILY
Refills: 0 | COMMUNITY
Start: 2019-05-29

## 2019-05-29 RX ORDER — ESCITALOPRAM OXALATE 5 MG/1
5 TABLET ORAL DAILY
Qty: 30 TABLET | Refills: 2 | Status: SHIPPED | OUTPATIENT
Start: 2019-05-29 | End: 2020-05-28

## 2019-05-29 RX ORDER — ASPIRIN 325 MG
50000 TABLET, DELAYED RELEASE (ENTERIC COATED) ORAL
COMMUNITY
Start: 2019-06-04 | End: 2019-07-17

## 2019-05-29 RX ADMIN — ESCITALOPRAM OXALATE 5 MG: 5 TABLET, FILM COATED ORAL at 08:05

## 2019-05-29 RX ADMIN — FERROUS SULFATE TAB 325 MG (65 MG ELEMENTAL FE) 325 MG: 325 (65 FE) TAB at 08:05

## 2019-05-29 RX ADMIN — HYDROXYZINE PAMOATE 50 MG: 50 CAPSULE ORAL at 01:05

## 2019-05-29 NOTE — PLAN OF CARE
Problem: Adult Behavioral Health Plan of Care  Goal: Plan of Care Review  Outcome: Ongoing (interventions implemented as appropriate)  Pt.  Slept 8  Hours  so far this shift without problems noted. Pt. Received Vistaril 50 mg at 0100 for c/o insomnia with effective results. q 15 min safety checks done this shift. Safety and comfort maintained. Cont. Plan.

## 2019-05-29 NOTE — PROGRESS NOTES
05/29/19 1145   [facility] Group Therapy   Group Name Leisure Education  (1:1 group with staff)   Specific Interventions Coping Skills Training  (Anger management&Stress management)   Participation Level Active;Sharing   Participation Quality Cooperative   Insight/Motivation Applies New Skills;Good   Affect/Mood Display Appropriate   Cognition Alert   Psychomotor WNL   Patient identified 3 ways to help cope with anger and decrease stress; think before speaking, take a time out to calm down and practice relaxation skills(deep breathing, exercise, journal, listening to music). Patient states she has to stop obsessing over things she can't control and count her blessings. Staff encouraged patient to follow up with healthy coping skills as discussed to increase her quality of life.

## 2019-05-29 NOTE — PSYCH
"Group Note    Behavior:    Patient attended group psychotherapy today. Patient exhibited very minor anticipatory anxiety concerning her discharge.       Intervention:      CBT-based group psychotherapy focused today on the identification of internal coping strategies to address issues that may arise when a crisis may be developing. A discussion of the things patients can do to take their minds off their problems without contacting another person were identified.     Response:    Patient stated that she has gotten a greater appreciation concerning the power of learning to wait patiently in expectation. Patient stated she is beginning to see "the connections between this and anika."       Plan    Patient will be prepared for discharge.       Plan:          "

## 2019-05-29 NOTE — CONSULTS
"  [facility]  Adult Nutrition  Consult Note    SUMMARY     Recommendations    Recommendation/Intervention: Continue regular diet as tolerated  Goals: adequate po intake >=85% EEN by discharge  Nutrition Goal Status: goal met  Communication of RD Recs: (poc)    Nutrition Discharge Planning: Regular diet with adequate intake to meet EEN/EPN    Reason for Assessment    Reason For Assessment: consult  Diagnosis: psychological disorder  General Information Comments: Pt admitted with post partum depression to be d/c today. Per Rn notes, pt accepting all meals with no issues reported. NFPE not appropriate due to psyc status      Nutrition Risk Screen    Nutrition Risk Screen: no indicators present    Nutrition/Diet History    Spiritual, Cultural Beliefs, Mormonism Practices, Values that Affect Care: no  Food Allergies: NKFA  Factors Affecting Nutritional Intake: None identified at this time    Anthropometrics    Temp: 98.6 °F (37 °C)  Height Method: Stated  Height: 5' 4" (162.6 cm)  Height (inches): 64 in  Weight Method: Standard Scale  Weight: 52.7 kg (116 lb 2.9 oz)  Weight (lb): 116.18 lb  Ideal Body Weight (IBW), Female: 120 lb  % Ideal Body Weight, Female (lb): 96.82 lb  BMI (Calculated): 20  BMI Grade: 18.5-24.9 - normal       Lab/Procedures/Meds    Pertinent Labs Reviewed: reviewed  Pertinent Medications Reviewed: reviewed     Scheduled Meds:   cholecalciferol (vitamin D3)  50,000 Units Oral Q7 Days    escitalopram oxalate  5 mg Oral Daily    ferrous sulfate  325 mg Oral Daily     Continuous Infusions:  PRN Meds:.acetaminophen, aluminum-magnesium hydroxide-simethicone, docusate sodium, hydrOXYzine pamoate, loperamide, OLANZapine **AND** OLANZapine    Estimated/Assessed Needs    Weight Used For Calorie Calculations: 52.7 kg (116 lb 2.9 oz)  Energy Calorie Requirements (kcal): 1584  Energy Need Method: [place](x1.25)  Protein Requirements: 42-53(0.8-1.0)  Weight Used For Protein " Calculations: 52.7 kg (116 lb 2.9 oz)     Estimated Fluid Requirement Method: RDA Method  RDA Method (mL): 1584         Nutrition Prescription Ordered    Current Diet Order: Regular  Nutrition Order Comments: +snacks    Evaluation of Received Nutrient/Fluid Intake    Energy Calories Required: meeting needs  Protein Required: meeting needs  Fluid Required: meeting needs  Tolerance: tolerating  % Intake of Estimated Energy Needs: 75 - 100 %  % Meal Intake: 75 - 100 %    Nutrition Risk    Level of Risk/Frequency of Follow-up: low     Assessment and Plan    Nutrition Diagnosis  No Nutrition Diagnosis at this time    Related to (etiology):   Adequate po intake    Signs and Symptoms (as evidenced by):   100% intake at this time     Nutrition Diagnosis Status:   New    Monitor and Evaluation    Food and Nutrient Intake: food and beverage intake  Food and Nutrient Adminstration: diet order  Knowledge/Beliefs/Attitudes: food and nutrition knowledge/skill, beliefs and attitudes  Physical Activity and Function: nutrition-related ADLs and IADLs  Anthropometric Measurements: weight, weight change  Biochemical Data, Medical Tests and Procedures: electrolyte and renal panel  Nutrition-Focused Physical Findings: overall appearance     Nutrition Follow-Up    RD Follow-up?: Yes

## 2019-05-29 NOTE — PSYCH
Patient was given a [employer] Prescription discount card application to assist her in getting discounts for her medications. Patient was also given the following information:    [facility]  [address] [place], [place] [id number] [phone]      Patient thanked this counselor for the information given to her.     Plan    Patient will be prepared for discharge.
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
## 2019-05-29 NOTE — NURSING
Received pt. Lying in bed at start of the shift with eyes closed. Resp. Even and unlabored without distress noted.

## 2019-05-29 NOTE — PSYCH
Patient will be following up with [facility] at [address] in [place], [phone].  Patient will walk in for initial assessment M-F between 8 and 2.  Patient does not use tobacco products and had a negative UDS on admit.  AVS faxed on 5/29/2019 at 11:24 am.

## 2019-05-29 NOTE — PROGRESS NOTES
PSYCHOTHERAPY ADD-ON +[id number]   30 (16-37*) minutes     Time: 16 minutes  Participants: Met with patient     Therapeutic Intervention Type: behavior modifying psychotherapy, supportive psychotherapy  Why chosen therapy is appropriate versus another modality: relevant to diagnosis, patient responds to this modality, evidence based practice     Target symptoms: depression, anxiety   Primary focus: psychosocial stressors  Psychotherapeutic techniques: supportive and problem solving techniques; psycho-education; safety plan and wrap up session     Outcome monitoring methods: self-report, observation     Patient's response to intervention:  The patient's response to intervention is accepting.     Progress toward goals:  The patient's progress toward goals is good.    Deuce Cuellar MD  Psychiatry

## 2019-05-29 NOTE — PSYCH
Order for discharge received.Discharge instructions explained to pt and voiced a understanding.Calm,cooperative,no si/hi,or psychosis.Pt's family will provide ride home.
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
## 2019-05-29 NOTE — PLAN OF CARE
Problem: Adult Behavioral Health Plan of Care  Goal: Plan of Care Review  Outcome: Ongoing (interventions implemented as appropriate)  Nutrition Recommendation/Intervention: Continue regular diet as tolerated  Goals: adequate po intake >=85% EEN by discharge  Nutrition Goal Status: goal met  Communication of RD Recs: (poc)    Nutrition Discharge Planning: Regular diet with adequate intake to meet EEN/EPN
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
